# Patient Record
Sex: FEMALE | Race: WHITE | NOT HISPANIC OR LATINO | Employment: FULL TIME | URBAN - METROPOLITAN AREA
[De-identification: names, ages, dates, MRNs, and addresses within clinical notes are randomized per-mention and may not be internally consistent; named-entity substitution may affect disease eponyms.]

---

## 2024-02-14 LAB
ANION GAP SERPL CALCULATED.3IONS-SCNC: 9 MMOL/L (ref 7–15)
BASOPHILS # BLD AUTO: 0 10E3/UL (ref 0–0.2)
BASOPHILS NFR BLD AUTO: 0 %
BUN SERPL-MCNC: 20.1 MG/DL (ref 6–20)
CALCIUM SERPL-MCNC: 9.6 MG/DL (ref 8.6–10)
CHLORIDE SERPL-SCNC: 106 MMOL/L (ref 98–107)
CREAT SERPL-MCNC: 0.87 MG/DL (ref 0.51–0.95)
DEPRECATED HCO3 PLAS-SCNC: 24 MMOL/L (ref 22–29)
EGFRCR SERPLBLD CKD-EPI 2021: 87 ML/MIN/1.73M2
EOSINOPHIL # BLD AUTO: 0 10E3/UL (ref 0–0.7)
EOSINOPHIL NFR BLD AUTO: 1 %
ERYTHROCYTE [DISTWIDTH] IN BLOOD BY AUTOMATED COUNT: 13.3 % (ref 10–15)
GLUCOSE SERPL-MCNC: 111 MG/DL (ref 70–99)
HCT VFR BLD AUTO: 36.9 % (ref 35–47)
HGB BLD-MCNC: 12.6 G/DL (ref 11.7–15.7)
IMM GRANULOCYTES # BLD: 0 10E3/UL
IMM GRANULOCYTES NFR BLD: 0 %
LYMPHOCYTES # BLD AUTO: 1.9 10E3/UL (ref 0.8–5.3)
LYMPHOCYTES NFR BLD AUTO: 29 %
MAGNESIUM SERPL-MCNC: 2.5 MG/DL (ref 1.7–2.3)
MCH RBC QN AUTO: 30.3 PG (ref 26.5–33)
MCHC RBC AUTO-ENTMCNC: 34.1 G/DL (ref 31.5–36.5)
MCV RBC AUTO: 89 FL (ref 78–100)
MONOCYTES # BLD AUTO: 0.4 10E3/UL (ref 0–1.3)
MONOCYTES NFR BLD AUTO: 6 %
NEUTROPHILS # BLD AUTO: 4.3 10E3/UL (ref 1.6–8.3)
NEUTROPHILS NFR BLD AUTO: 64 %
NRBC # BLD AUTO: 0 10E3/UL
NRBC BLD AUTO-RTO: 0 /100
PLATELET # BLD AUTO: 253 10E3/UL (ref 150–450)
POTASSIUM SERPL-SCNC: 4 MMOL/L (ref 3.4–5.3)
RBC # BLD AUTO: 4.16 10E6/UL (ref 3.8–5.2)
SODIUM SERPL-SCNC: 139 MMOL/L (ref 135–145)
TROPONIN T SERPL HS-MCNC: <6 NG/L
WBC # BLD AUTO: 6.8 10E3/UL (ref 4–11)

## 2024-02-14 PROCEDURE — 93005 ELECTROCARDIOGRAM TRACING: CPT | Performed by: EMERGENCY MEDICINE

## 2024-02-14 PROCEDURE — 85025 COMPLETE CBC W/AUTO DIFF WBC: CPT | Performed by: EMERGENCY MEDICINE

## 2024-02-14 PROCEDURE — 36415 COLL VENOUS BLD VENIPUNCTURE: CPT | Performed by: EMERGENCY MEDICINE

## 2024-02-14 PROCEDURE — 99284 EMERGENCY DEPT VISIT MOD MDM: CPT | Mod: 25

## 2024-02-14 PROCEDURE — 80048 BASIC METABOLIC PNL TOTAL CA: CPT | Performed by: EMERGENCY MEDICINE

## 2024-02-14 PROCEDURE — 83735 ASSAY OF MAGNESIUM: CPT | Performed by: EMERGENCY MEDICINE

## 2024-02-14 PROCEDURE — 84484 ASSAY OF TROPONIN QUANT: CPT | Performed by: EMERGENCY MEDICINE

## 2024-02-14 PROCEDURE — 82248 BILIRUBIN DIRECT: CPT | Performed by: EMERGENCY MEDICINE

## 2024-02-14 PROCEDURE — 80053 COMPREHEN METABOLIC PANEL: CPT | Performed by: EMERGENCY MEDICINE

## 2024-02-14 RX ORDER — ONDANSETRON 2 MG/ML
4 INJECTION INTRAMUSCULAR; INTRAVENOUS ONCE
Status: COMPLETED | OUTPATIENT
Start: 2024-02-15 | End: 2024-02-15

## 2024-02-15 ENCOUNTER — HOSPITAL ENCOUNTER (EMERGENCY)
Facility: CLINIC | Age: 39
Discharge: HOME | End: 2024-02-15
Attending: EMERGENCY MEDICINE | Admitting: EMERGENCY MEDICINE
Payer: COMMERCIAL

## 2024-02-15 VITALS
HEIGHT: 60 IN | BODY MASS INDEX: 29.45 KG/M2 | TEMPERATURE: 98.2 F | DIASTOLIC BLOOD PRESSURE: 62 MMHG | OXYGEN SATURATION: 98 % | SYSTOLIC BLOOD PRESSURE: 102 MMHG | RESPIRATION RATE: 16 BRPM | WEIGHT: 150 LBS | HEART RATE: 75 BPM

## 2024-02-15 DIAGNOSIS — R55 SYNCOPE, UNSPECIFIED SYNCOPE TYPE: ICD-10-CM

## 2024-02-15 LAB
ALBUMIN SERPL BCG-MCNC: 4.2 G/DL (ref 3.5–5.2)
ALP SERPL-CCNC: 42 U/L (ref 40–150)
ALT SERPL W P-5'-P-CCNC: 13 U/L (ref 0–50)
AST SERPL W P-5'-P-CCNC: 20 U/L (ref 0–45)
ATRIAL RATE - MUSE: 70 BPM
BILIRUB DIRECT SERPL-MCNC: <0.2 MG/DL (ref 0–0.3)
BILIRUB SERPL-MCNC: 0.3 MG/DL
DIASTOLIC BLOOD PRESSURE - MUSE: NORMAL MMHG
INTERPRETATION ECG - MUSE: NORMAL
P AXIS - MUSE: 43 DEGREES
PR INTERVAL - MUSE: 138 MS
PROT SERPL-MCNC: 6.8 G/DL (ref 6.4–8.3)
QRS DURATION - MUSE: 76 MS
QT - MUSE: 392 MS
QTC - MUSE: 423 MS
R AXIS - MUSE: 60 DEGREES
SYSTOLIC BLOOD PRESSURE - MUSE: NORMAL MMHG
T AXIS - MUSE: 33 DEGREES
VENTRICULAR RATE- MUSE: 70 BPM

## 2024-02-15 PROCEDURE — 250N000011 HC RX IP 250 OP 636: Performed by: EMERGENCY MEDICINE

## 2024-02-15 PROCEDURE — 96361 HYDRATE IV INFUSION ADD-ON: CPT

## 2024-02-15 PROCEDURE — 258N000003 HC RX IP 258 OP 636: Performed by: EMERGENCY MEDICINE

## 2024-02-15 PROCEDURE — 96374 THER/PROPH/DIAG INJ IV PUSH: CPT

## 2024-02-15 RX ADMIN — ONDANSETRON 4 MG: 2 INJECTION INTRAMUSCULAR; INTRAVENOUS at 00:06

## 2024-02-15 RX ADMIN — SODIUM CHLORIDE 1000 ML: 9 INJECTION, SOLUTION INTRAVENOUS at 00:05

## 2024-02-15 ASSESSMENT — ENCOUNTER SYMPTOMS
VOMITING: 0
NAUSEA: 1
FATIGUE: 1
ABDOMINAL DISTENTION: 1
CONSTIPATION: 1
LIGHT-HEADEDNESS: 1

## 2024-02-15 ASSESSMENT — ACTIVITIES OF DAILY LIVING (ADL): ADLS_ACUITY_SCORE: 35

## 2024-02-15 NOTE — ED TRIAGE NOTES
Pt was in a movie theater around 2030 when she began to feel nauseated, got up to go to the restroom and had syncopal episode. Was assessed and had low BP at the time. Pt still feels weak and mildly nauseated.     Triage Assessment (Adult)       Row Name 02/14/24 1909          Triage Assessment    Airway WDL WDL        Respiratory WDL    Respiratory WDL WDL        Skin Circulation/Temperature WDL    Skin Circulation/Temperature WDL WDL        Cardiac WDL    Cardiac WDL WDL        Peripheral/Neurovascular WDL    Peripheral Neurovascular WDL WDL        Cognitive/Neuro/Behavioral WDL    Cognitive/Neuro/Behavioral WDL WDL

## 2024-02-15 NOTE — ED PROVIDER NOTES
EMERGENCY DEPARTMENT ENCOUNTER      NAME: Minerva Vaca  AGE: 38 year old female  YOB: 1985  MRN: [id number]  EVALUATION DATE & TIME: No admission date for patient encounter.    [facility]    ED PROVIDER: Tiffanie Santiago M.D.      Chief Complaint   Patient presents with    Syncope    Nausea         FINAL IMPRESSION:  1. Syncope, unspecified syncope type        MEDICAL DECISION MAKING:    Pertinent Labs & Imaging studies reviewed. (See chart for details)  ED Course as of 02/15/24 0205   Thu Feb 15, 2024   0128 Afebrile vital signs here blood pressure is 100/60.  Patient is coming in after syncopal episode.  Says she was sitting in a movie, she reports she has been constipated over the last few days.  Was eating popcorn, suddenly felt nauseous and stood up quickly and then she passed out.  She does not have any other symptoms prior to her passing out just feeling of nausea and then when she stood up quickly she then passed out.  Bystanders did not report any shaking like to be helped up afterwards, was feeling slightly lightheaded.  She notes that she has not had a bowel movement last several days, is taking Colace.  Otherwise has not been sick with anything.    Exam for patient here unremarkable.       0130 EKG done here shows sinus rhythm with sinus arrhythmia at a rate of 70.  Normal axis.  No ST elevations or depressions.  Intervals intact.  QTc is 423, QRS 76, .  No previous EKGs for comparison.  Check labs for patient here, unremarkable.  She is low risk by Wells, PERC negative.  No signs or symptoms of PE.  Looks well here.  Was given a liter of fluid and was able to up and ambulate around without difficulty and without further symptoms.  Have her follow-up with primary care doctor.  Return for any new or worsening symptoms.  Talked her about her constipation, recommended MiraLAX.  She has not felt nauseous since that initial time when she was in the movie theater.          Medical Decision Making  Obtained supplemental history:Supplemental history obtained?: Documented in chart  Reviewed external records: External records reviewed?: Documented in chart  Care impacted by chronic illness:N/A  Care significantly affected by social determinants of health:N/A  Did you consider but not order tests?: Work up considered but not performed and documented in chart, if applicable  Did you interpret images independently?: Independent interpretation of ECG and images noted in documentation, when applicable.  Consultation discussion with other provider:Did you involve another provider (consultant, , pharmacy, etc.)?: No  Discharge. No recommendations on prescription strength medication(s). See documentation for any additional details.      Critical care: 0 minutes excluding separately billable procedures.  Includes bedside management, time reviewing test results, review of records, discussing the case with staff, documenting the medical record and time spent with family members (or surrogate decision makers) discussing specific treatment issues.          ED COURSE:  11:55 PM I met with the patient, obtained history, performed an initial exam, and discussed options and plan for diagnostics and treatment here in the ED.     The importance of close follow up was discussed. We reviewed warning signs and symptoms, and I instructed Ms. Vaca to return to the emergency department immediately if she develops any new or worsening symptoms. I provided additional verbal discharge instructions. Ms. Vaca expressed understanding and agreement with this plan of care, her questions were answered, and she was discharged in stable condition.     MEDICATIONS GIVEN IN THE EMERGENCY:  Medications   sodium chloride 0.9% BOLUS 1,000 mL (0 mLs Intravenous Stopped 2/15/24 0057)   ondansetron (ZOFRAN) injection 4 mg (4 mg Intravenous $Given 2/15/24 0006)       NEW PRESCRIPTIONS STARTED AT TODAY'S ER VISIT:  There  are no discharge medications for this patient.         =================================================================    HPI    Patient information was obtained from: Patient    Use of : N/A     Minerva Vaca is a 38 year old female with no pertinent medical history who presents with syncope and nausea.      The patient reports developing nausea while at the movie theater this evening.  She reportedly got up and then fell while passing out. She reports fatigue and lightheadedness since passing out.  She has been constipated the last couple of days and has tried taking Colace.  She also reports abdominal distension.  No vomiting.  She denies any history of diabetes.  She is not on any chronic medications/    REVIEW OF SYSTEMS   Review of Systems   Constitutional:  Positive for fatigue.   Gastrointestinal:  Positive for abdominal distention, constipation and nausea. Negative for vomiting.   Neurological:  Positive for syncope and light-headedness.   All other systems reviewed and are negative.    PAST MEDICAL HISTORY:  No past medical history on file.    PAST SURGICAL HISTORY:  No past surgical history on file.    CURRENT MEDICATIONS:    No current facility-administered medications for this encounter.  No current outpatient medications on file.    ALLERGIES:  No Known Allergies    FAMILY HISTORY:  No family history on file.    SOCIAL HISTORY:   Social History     Socioeconomic History    Marital status:        PHYSICAL EXAM:    Vitals: /62   Pulse 75   Temp 98.2  F (36.8  C) (Temporal)   Resp 16   Ht 1.524 m (5')   Wt 68 kg (150 lb)   LMP 02/08/2024 (Approximate)   SpO2 98%   BMI 29.29 kg/m     General:. Alert and interactive, comfortable appearing.  HENT: Oropharynx without erythema or exudates. MMM.  TMs clear bilaterally.  Eyes: Pupils mid-sized and equally reactive.   Neck: Full AROM.  No midline tenderness to palpation.  Cardiovascular: Regular rate and rhythm. Peripheral pulses  2+ bilaterally.  Chest/Pulmonary: Normal work of breathing. Lung sounds clear and equal throughout, no wheezes or crackles. No chest wall tenderness or deformities.  Abdomen: Soft, nondistended. Nontender without guarding or rebound.  Back/Spine: No CVA or midline tenderness.  Extremities: Normal ROM of all major joints. No lower extremity edema.   Skin: Warm and dry. Normal skin color.   Neuro: Speech clear. CNs grossly intact. Moves all extremities appropriately. Strength and sensation grossly intact to all extremities.   Psych: Normal affect/mood, cooperative, memory appropriate.     LAB:  All pertinent labs reviewed and interpreted.  Labs Ordered and Resulted from Time of ED Arrival to Time of ED Departure   BASIC METABOLIC PANEL - Abnormal       Result Value    Sodium 139      Potassium 4.0      Chloride 106      Carbon Dioxide (CO2) 24      Anion Gap 9      Urea Nitrogen 20.1 (*)     Creatinine 0.87      GFR Estimate 87      Calcium 9.6      Glucose 111 (*)    MAGNESIUM - Abnormal    Magnesium 2.5 (*)    TROPONIN T, HIGH SENSITIVITY - Normal    Troponin T, High Sensitivity <6     HEPATIC FUNCTION PANEL - Normal    Protein Total 6.8      Albumin 4.2      Bilirubin Total 0.3      Alkaline Phosphatase 42      AST 20      ALT 13      Bilirubin Direct <0.20     CBC WITH PLATELETS AND DIFFERENTIAL    WBC Count 6.8      RBC Count 4.16      Hemoglobin 12.6      Hematocrit 36.9      MCV 89      MCH 30.3      MCHC 34.1      RDW 13.3      Platelet Count 253      % Neutrophils 64      % Lymphocytes 29      % Monocytes 6      % Eosinophils 1      % Basophils 0      % Immature Granulocytes 0      NRBCs per 100 WBC 0      Absolute Neutrophils 4.3      Absolute Lymphocytes 1.9      Absolute Monocytes 0.4      Absolute Eosinophils 0.0      Absolute Basophils 0.0      Absolute Immature Granulocytes 0.0      Absolute NRBCs 0.0         RADIOLOGY:  No orders to display       EKG:  See MDM      PROCEDURES:   None    Rufus TOM  Mady, am serving as a scribe to document services personally performed by Dr. Tiffanie Santiago  based on my observation and the provider's statements to me. I, Tiffanie Santiago MD attest that Rufus Earl is acting in a scribe capacity, has observed my performance of the services and has documented them in accordance with my direction.      Tiffanie Santiago M.D.  Emergency Medicine  [facility] EMERGENCY ROOM  [address] [facility] [id number]  [phone]  Dept: [phone]      Tiffanie Santiago MD  02/15/24 0201

## 2024-02-15 NOTE — DISCHARGE INSTRUCTIONS
Please make an appointment to follow-up with your primary care doctor.  As we discussed you can try MiraLAX for your constipation.